# Patient Record
Sex: MALE | Race: WHITE
[De-identification: names, ages, dates, MRNs, and addresses within clinical notes are randomized per-mention and may not be internally consistent; named-entity substitution may affect disease eponyms.]

---

## 2018-03-31 ENCOUNTER — HOSPITAL ENCOUNTER (EMERGENCY)
Dept: HOSPITAL 17 - PHED | Age: 38
LOS: 1 days | Discharge: HOME | End: 2018-04-01
Payer: COMMERCIAL

## 2018-03-31 VITALS — HEIGHT: 73 IN | WEIGHT: 224.87 LBS | BODY MASS INDEX: 29.8 KG/M2

## 2018-03-31 VITALS
OXYGEN SATURATION: 100 % | TEMPERATURE: 99.1 F | HEART RATE: 98 BPM | DIASTOLIC BLOOD PRESSURE: 74 MMHG | RESPIRATION RATE: 18 BRPM | SYSTOLIC BLOOD PRESSURE: 159 MMHG

## 2018-03-31 VITALS
SYSTOLIC BLOOD PRESSURE: 166 MMHG | HEART RATE: 99 BPM | OXYGEN SATURATION: 99 % | DIASTOLIC BLOOD PRESSURE: 70 MMHG | RESPIRATION RATE: 18 BRPM

## 2018-03-31 DIAGNOSIS — R42: ICD-10-CM

## 2018-03-31 DIAGNOSIS — S16.1XXA: Primary | ICD-10-CM

## 2018-03-31 DIAGNOSIS — Z79.899: ICD-10-CM
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
## 2018-03-31 DIAGNOSIS — F41.9: ICD-10-CM

## 2018-03-31 DIAGNOSIS — J32.0: ICD-10-CM

## 2018-03-31 DIAGNOSIS — S01.81XA: ICD-10-CM

## 2018-03-31 DIAGNOSIS — R55: ICD-10-CM

## 2018-03-31 DIAGNOSIS — S09.90XA: ICD-10-CM

## 2018-03-31 PROCEDURE — 70450 CT HEAD/BRAIN W/O DYE: CPT

## 2018-03-31 PROCEDURE — 12011 RPR F/E/E/N/L/M 2.5 CM/<: CPT

## 2018-03-31 PROCEDURE — 72125 CT NECK SPINE W/O DYE: CPT

## 2018-03-31 NOTE — PD
HPI


Chief Complaint:  Head Injury


Time Seen by Provider:  22:56


Travel History


International Travel<30 days:  No


Contact w/Intl Traveler<30days:  No


Traveled to known affect area:  No





History of Present Illness


HPI


38-year-old male presents to the emergency department for evaluation of head 

injury.  Patient states around 8:30 PM he was with his parents and his mother 

had just recently had hip surgery and he started to feel lightheaded and dizzy.

  Patient states he does not tolerate medical issues or health issues well and 

typically becomes lightheaded or symptomatic around persons who have had 

illnesses.  Patient states he started to feel himself become dizzy so he sat 

down in a chair and started to lean forward to put his head between his knees 

when he passed out and fell onto the floor.  Father was at his side and states 

he seemed to jerk a little bit and this stopped and patient states once he was 

flat apparently he awakened he knew exactly what happened who he was very was 

and what had occurred.  There is no tongue trauma no bladder or bowel 

incontinence.  Patient is aware prior history of vasovagal near syncope and 

syncope.  Patient states that he cleansed the wound site and put a Steri-Strip 

in place his tetanus status is less than 5 years and denies other injury.  

Patient presents due to hitting his head on a tile floor.  Patient has some 

mild neck pain.  Patient states he has chronic neck pain.  Patient denies any 

new upper or lower extremity numbness tingling or weakness.  Patient's had no 

altered mentation no visual disturbance headache is not sudden onset 

thunderclap or worst ever and denies other injury or complaint.  No chest pain 

no rib pain no shortness of breath no abdominal pain no back pain and no pelvic 

pain injury.  The patient rates his current discomfort 0/10 intensity.





[facility]


Past Medical History


*** Narrative Medical


Anxiety, vasovagal syncope; occasional alcohol use; nursing notes reviewed


Depression:  Yes


Tetanus Vaccination:  < 5 Years


Influenza Vaccination:  Yes





Social History


Alcohol Use:  Yes (Soc)


Tobacco Use:  No


Substance Use:  No





Allergies-Medications


(Allergen,Severity, Reaction):  


Coded Allergies:  


     No Known Allergies (Unverified , 3/31/18)


Reported Meds & Prescriptions





Reported Meds & Active Scripts


Active


Reported


Zoloft (Sertraline HCl) 100 Mg Tab 100 Mg PO DAILY


Buspirone (Buspirone HCl) 7.5 Mg Tab 7.5 Mg PO DAILY





Review of Systems


Except as stated in HPI:  all other systems reviewed are Neg





Physical Exam


Narrative


GENERAL: Well-developed well-nourished male no acute distress no respiratory 

distress; GCS 15


SKIN: Warm and dry.


HEAD: Atraumatic. Normocephalic.  Except for right forehead linear 2 cm 

laceration with wound edges approximated Steri-Strips in place superficial 

abrasion


EYES: Pupils equal and round.  Extraocular muscles intact.  No scleral icterus. 

No injection or drainage.  No periorbital rim step-off or bony point tenderness 

bilaterally.


ENT: No nasal bleeding or discharge.  Mucous membranes pink and moist.  Airway 

is patent.  No dental malocclusion.  No hemotympanum.


NECK: Trachea midline. No JVD.  No midline tenderness or bony step-off to 

palpation.


CARDIOVASCULAR: Regular rate and rhythm.  


RESPIRATORY: No accessory muscle use. Clear to auscultation. Breath sounds 

equal bilaterally. 


GASTROINTESTINAL: Abdomen soft, non-tender, nondistended. Hepatic and splenic 

margins not palpable. 


MUSCULOSKELETAL: Extremities without clubbing, cyanosis, or edema. No obvious 

deformities. 


NEUROLOGICAL: Awake and alert. No obvious cranial nerve deficits.  Motor 

grossly within normal limits. Five out of 5 muscle strength in the arms and 

legs.  Normal speech.


PSYCHIATRIC: Appropriate mood and affect; insight and judgment normal.





Data


Data


Last Documented VS





Vital Signs








  Date Time  Temp Pulse Resp B/P (MAP) Pulse Ox O2 Delivery O2 Flow Rate FiO2


 


3/31/18 22:21  99 18 166/70 (102) 99 Room Air  


 


3/31/18 22:05 99.1       








Orders





 Orders


Ct Brain W/O Iv Contrast(Rout) (3/31/18 )


Ct Cerv Spine W/O Contrast (3/31/18 )


Ice/Cold Pack (3/31/18 22:56)


Wound Care (3/31/18 22:56)








MDM


Medical Decision Making


Medical Screen Exam Complete:  Yes


Emergency Medical Condition:  Yes


Medical Record Reviewed:  Yes


Interpretation(s)





Last Impressions








Head CT 3/31/18 0000 Signed





Impressions: 





 Service Date/Time:  Saturday, March 31, 2018 23:07 - CONCLUSION:  1. No acute 





 intracranial abnormalities. Right maxillary sinus disease.     Leonid Iyer MD 


 


Cervical Spine CT 3/31/18 0000 Signed





Impressions: 





 Service Date/Time:  Saturday, March 31, 2018 23:07 - CONCLUSION:  Normal 





 examination for a patient of this age.       Leonid Iyer MD 








Vital Signs








  Date Time  Temp Pulse Resp B/P (MAP) Pulse Ox O2 Delivery O2 Flow Rate FiO2


 


3/31/18 22:21  99 18 166/70 (102) 99 Room Air  


 


3/31/18 22:17  99 18  99 Room Air  


 


3/31/18 22:05 99.1 98 18 159/74 (102) 100   








Differential Diagnosis


Minor closed head injury, forehead laceration, cervical spine sprain strain 

myofascial injury, laceration, ICH, arrhythmia, seizure


Narrative Course


Patient appears to have had a vasovagal event as he was resented with a 

stressful situation felt himself become lightheaded attempted to abort the 

syncopal episode by sitting down but had already started to have syncopal 

episode and landed face first on the ground.  Slight jerking most likely was 

not seizure form activity but related to transient hypotension with decreased 

perfusion of the brain.  Patient was not post ictal and states he has been his 

normal mentation without other findings or focal neurologic complaint 

subsequently.  Wound site is cleansed with saline and dilute Betadine and 

tetanus status is less than 5 years.  Wound site closed with wound adhesive.  

Imaging studies ordered


It is midnight and imaging studies reveal no acute process


Patient is stable for outpatient management and follow-up with his primary care 

provider





Procedures


**Procedure Narrative**


LACERATION


LOCATION: Forehead


LENGTH: 2 cm


NUMBER OF STITCHES/STAPLES: Wound adhesive





REPAIR: The area of the laceration was prepped with Betadine and sterilely 

draped.  The wound was copiously irrigated and explored without evidence of 

foreign body, tendon injury or neurovascular injury.  The wound was closed 

using wound adhesive. This was a single layer repair. The patient was advised 

to keep the site clean and dry. Patient tolerated the procedure well.  Tetanus 

status less than 5 years





Diagnosis





 Primary Impression:  


 Minor closed head injury


 Additional Impressions:  


 Cervical myofascial strain


 Qualified Codes:  S16.1XXA - Strain of muscle, fascia and tendon at neck level

, initial encounter


 Forehead laceration


 Qualified Codes:  S01.81XA - Laceration without foreign body of other part of 

head, initial encounter


Referrals:  


Primary Care Physician


2 days


Patient Instructions:  General Instructions





***Additional Instructions:  


Keep wound site clean and dry


May apply ice intermittently to areas of soft tissue swelling for the first 12-

24 hours


Follow head injury precautions for 24 hours


May take as tolerated ibuprofen and/or acetaminophen for minor discomfort or 

for fever 100.4F or greater


Return to the emergency department for any concerns or change in condition


Recommend wound check at 2 days to the emergency department or with your 

primary care provider


Disposition:  01 DISCHARGE HOME


Condition:  Stable











Lucy Fernandez MD Mar 31, 2018 23:59

## 2018-03-31 NOTE — RADRPT
EXAM DATE/TIME:  03/31/2018 23:07 

 

[facility] COMPARISON:     

No previous studies available for comparison.

 

 

INDICATIONS :     

Syncope episode, hit head, laceration to right forehead

                      

 

RADIATION DOSE:     

60.58 CTDIvol (mGy) 

 

 

 

MEDICAL HISTORY :     

None  

 

SURGICAL HISTORY :      

None. 

 

ENCOUNTER:      

Initial

 

ACUITY:      

1 day

 

PAIN SCALE:      

0/10

 

LOCATION:       

Right frontal 

 

TECHNIQUE:     

Multiple contiguous axial images were obtained of the head.  Using automated exposure control and adj
ustment of the mA and/or kV according to patient size, radiation dose was kept as low as reasonably a
chievable to obtain optimal diagnostic quality images.   DICOM format image data is available electro
nically for review and comparison.  

 

FINDINGS:     

 

CEREBRUM:     

The ventricles are normal for age.  No evidence of midline shift, mass lesion, hemorrhage or acute in
farction.  No extra-axial fluid collections are seen.

 

POSTERIOR FOSSA:     

The cerebellum and brainstem are intact.  The 4th ventricle is midline.  The cerebellopontine angle i
s unremarkable.

 

EXTRACRANIAL:     

The visualized portion of the orbits is intact.

 

SKULL:     

The calvaria is intact.  No evidence of skull fracture.

 

CONCLUSION:     

1. No acute intracranial abnormalities. Right maxillary sinus disease.

 

 

 

 Leonid Iyer MD on March 31, 2018 at 23:14           

Board Certified Radiologist.

 This report was verified electronically.

## 2018-03-31 NOTE — RADRPT
EXAM DATE/TIME:  03/31/2018 23:07 

 

[facility] COMPARISON:     

No previous studies available for comparison.

 

 

INDICATIONS :     

Syncopal episode, hit head

                      

 

RADIATION DOSE:     

26.47 CTDIvol (mGy) 

 

 

 

MEDICAL HISTORY :     

None  

 

SURGICAL HISTORY :      

None. 

 

ENCOUNTER:      

Initial

 

ACUITY:      

1 day

 

PAIN SCALE:      

0/10

 

LOCATION:        

neck 

 

TECHNIQUE:     

Volumetric scanning of the cervical spine was performed. Multiplanar reconstructions in the sagittal,
 coronal and oblique axial planes were performed.   Using automated exposure control and adjustment o
f the mA and/or kV according to patient size, radiation dose was kept as low as reasonably achievable
 to obtain optimal diagnostic quality images.   DICOM format image data is available electronically f
or review and comparison.  

 

FINDINGS:     

 

VERTEBRAE:     

Normal vertebral body height.

 

ALIGNMENT:     

No evidence of subluxation.

 

C2-C3:  

The bony spinal canal is normal in size.  No evidence of disc bulge or herniation.  The neural forami
na are bilaterally patent.

 

C3-C4:  

The bony spinal canal is normal in size.  No evidence of disc bulge or herniation.  The neural forami
na are bilaterally patent.

 

C4-C5:  

The bony spinal canal is normal in size.  No evidence of disc bulge or herniation.  The neural forami
na are bilaterally patent.

 

C5-C6:  

The bony spinal canal is normal in size.  No evidence of disc bulge or herniation.  The neural forami
na are bilaterally patent.

 

C6-C7:  

The bony spinal canal is normal in size.  No evidence of disc bulge or herniation.  The neural forami
na are bilaterally patent.

 

C7-T1:  

The bony spinal canal is normal in size.  No evidence of disc bulge or herniation.  The neural forami
na are bilaterally patent.

 

CONCLUSION:     

Normal examination for a patient of this age.  

 

 

 

 Leonid Iyer MD on March 31, 2018 at 23:25           

Board Certified Radiologist.

 This report was verified electronically.